# Patient Record
Sex: MALE
[De-identification: names, ages, dates, MRNs, and addresses within clinical notes are randomized per-mention and may not be internally consistent; named-entity substitution may affect disease eponyms.]

---

## 2023-10-13 ENCOUNTER — NURSE TRIAGE (OUTPATIENT)
Dept: OTHER | Facility: CLINIC | Age: 43
End: 2023-10-13

## 2023-10-13 NOTE — TELEPHONE ENCOUNTER
Shahla Ramachandran  1980  Pt ID:  [id number]  MD:  Yohannes COBURN     Calling for prescriptions not at pharmacy. States a muscle relaxer and pain medication. Warm transfer to [facility] for assistance.